# Patient Record
Sex: FEMALE | Race: BLACK OR AFRICAN AMERICAN | NOT HISPANIC OR LATINO
[De-identification: names, ages, dates, MRNs, and addresses within clinical notes are randomized per-mention and may not be internally consistent; named-entity substitution may affect disease eponyms.]

---

## 2021-10-26 ENCOUNTER — APPOINTMENT (OUTPATIENT)
Dept: HEART AND VASCULAR | Facility: CLINIC | Age: 51
End: 2021-10-26

## 2022-12-20 ENCOUNTER — NON-APPOINTMENT (OUTPATIENT)
Age: 52
End: 2022-12-20

## 2022-12-20 ENCOUNTER — APPOINTMENT (OUTPATIENT)
Dept: HEART AND VASCULAR | Facility: CLINIC | Age: 52
End: 2022-12-20

## 2022-12-20 VITALS
WEIGHT: 138 LBS | HEART RATE: 114 BPM | BODY MASS INDEX: 22.18 KG/M2 | TEMPERATURE: 97.8 F | DIASTOLIC BLOOD PRESSURE: 96 MMHG | HEIGHT: 66 IN | SYSTOLIC BLOOD PRESSURE: 140 MMHG | OXYGEN SATURATION: 99 % | RESPIRATION RATE: 16 BRPM

## 2022-12-20 PROCEDURE — 93000 ELECTROCARDIOGRAM COMPLETE: CPT

## 2022-12-20 PROCEDURE — 36415 COLL VENOUS BLD VENIPUNCTURE: CPT

## 2022-12-20 PROCEDURE — 99214 OFFICE O/P EST MOD 30 MIN: CPT | Mod: 25

## 2022-12-20 NOTE — REASON FOR VISIT
[Follow-Up - Clinic] : a clinic follow-up of [Hypertension] : hypertension [Palpitations] : palpitations [FreeTextEntry1] : 52 year old female with history of HTN, Preeclampsia last seen in 2015. She is on Coreg 20 mg PO daily and Amlodipine-Olmesartan 10-40 1 tab PO daily. She states she has been having some left sided chest discomfort, mainly around time of menstruation. Denies dyspnea, orthopnea, PND, edema. \par \par EKG today Sinus tachycardia at 102 bpm without STT abnormalities. \par \par Holter March 2015: ST at 116 bpm No APC/PVC. Monitor on for 4 days. \par \par Labs Aug 23 2022: A1C 4.75; Hgb 9.8; Hct 30.8; CMP WNL; Total chol 213; TG 55; HDL 72; ; TFTs WNL

## 2022-12-20 NOTE — DISCUSSION/SUMMARY
[Unlikely Cardiac Ischemia (Low Prob.)] : chest pain unlikely to represent cardiac ischemia (low probability) [Hypertension] : hypertension [Stable] : stable [Echocardiogram] : echocardiogram [Stress Echocardiogram] : stress echocardiogram [Lipids Test Panel] : a fasting lipid profile [Diet Modification] : diet modification [Exercise] : exercise [Sinus Tachycardia] : sinus tachycardia [de-identified] :  [de-identified] : Lipid panel today - nonfasting [de-identified] : pt to check BP 3 times daily and keep log [de-identified] : c/w Amlodipine-Olmesartan 10-40 1 tab PO daily and Coreg CR 20 mg PO daily [de-identified] : resolved [FreeTextEntry3] : after testing completed

## 2022-12-20 NOTE — PHYSICAL EXAM
[General Appearance - Well Developed] : well developed [Normal Appearance] : normal appearance [Well Groomed] : well groomed [General Appearance - Well Nourished] : well nourished [No Deformities] : no deformities [General Appearance - In No Acute Distress] : no acute distress [Normal Oral Mucosa] : normal oral mucosa [No Oral Pallor] : no oral pallor [No Oral Cyanosis] : no oral cyanosis [Normal Jugular Venous A Waves Present] : normal jugular venous A waves present [Normal Jugular Venous V Waves Present] : normal jugular venous V waves present [No Jugular Venous Alanis A Waves] : no jugular venous alanis A waves [Heart Rate And Rhythm] : heart rate and rhythm were normal [Heart Sounds] : normal S1 and S2 [Murmurs] : no murmurs present [Respiration, Rhythm And Depth] : normal respiratory rhythm and effort [Exaggerated Use Of Accessory Muscles For Inspiration] : no accessory muscle use [Auscultation Breath Sounds / Voice Sounds] : lungs were clear to auscultation bilaterally [Nail Clubbing] : no clubbing of the fingernails [Cyanosis, Localized] : no localized cyanosis [Petechial Hemorrhages (___cm)] : no petechial hemorrhages [] : no ischemic changes

## 2022-12-27 ENCOUNTER — APPOINTMENT (OUTPATIENT)
Dept: HEART AND VASCULAR | Facility: CLINIC | Age: 52
End: 2022-12-27

## 2023-01-03 ENCOUNTER — APPOINTMENT (OUTPATIENT)
Dept: HEART AND VASCULAR | Facility: CLINIC | Age: 53
End: 2023-01-03

## 2023-03-07 ENCOUNTER — APPOINTMENT (OUTPATIENT)
Dept: HEART AND VASCULAR | Facility: CLINIC | Age: 53
End: 2023-03-07

## 2024-03-12 ENCOUNTER — APPOINTMENT (OUTPATIENT)
Dept: HEART AND VASCULAR | Facility: CLINIC | Age: 54
End: 2024-03-12
Payer: COMMERCIAL

## 2024-03-12 VITALS
SYSTOLIC BLOOD PRESSURE: 118 MMHG | TEMPERATURE: 97.2 F | BODY MASS INDEX: 22.02 KG/M2 | HEART RATE: 84 BPM | WEIGHT: 137 LBS | DIASTOLIC BLOOD PRESSURE: 82 MMHG | OXYGEN SATURATION: 98 % | RESPIRATION RATE: 16 BRPM | HEIGHT: 66 IN

## 2024-03-12 DIAGNOSIS — I10 ESSENTIAL (PRIMARY) HYPERTENSION: ICD-10-CM

## 2024-03-12 PROCEDURE — 99214 OFFICE O/P EST MOD 30 MIN: CPT

## 2024-03-12 NOTE — REASON FOR VISIT
[FreeTextEntry1] : 53 year old female with history of HTN, Preeclampsia in the past. here for preoperative cardiac risk assessment prior to robotic partial nephrectomy and partial hysterectomy. She was last seen in December 2022.  Found to have renal mass in left kidney on CT. She is on Coreg 20 mg PO daily and Amlodipine-Olmesartan 10-40 1 tab PO daily. She states she gets occasional chest discomfort at time of menstruation. However can walk up a flight of stairs without chest pain and dyspnea. No PND, edema.   CT A/P 01/29/2024: Homogenous enhancing mass in the left kidney concerning for renal neoplasm, possibly papillary, compatible with reported history.  EKG 02/26/2024: NSR at 79 bpm with NSST in lead III EKG 12/2022: Sinus tachycardia at 102 bpm without STT abnormalities.  Holter March 2015: ST at 116 bpm No APC/PVC. Monitor on for 4 days.  Labs Jan 11 2024: Hgb 9.7; Hct 33.7; CMP WNL; Total chol 179; TG 49; HDL 58; ; TFTs WNL Labs Aug 23 2022: A1C 4.75; Hgb 9.8; Hct 30.8; CMP WNL; Total chol 213; TG 55; HDL 72; ; TFTs WNL

## 2024-03-12 NOTE — DISCUSSION/SUMMARY
[FreeTextEntry1] : 53 year old female with history of HTN, Preeclampsia in the past. here for preoperative cardiac risk assessment prior to robotic partial nephrectomy and partial hysterectomy. CVS stable - May proceed with procedure without further cardiac workup.  - Can continue anti-HTN medications.  - Followup in 6 monts.

## 2024-03-12 NOTE — REVIEW OF SYSTEMS
[Negative] : Heme/Lymph [FreeTextEntry2] : except as above [FreeTextEntry6] : except as above [FreeTextEntry7] : except as above

## 2024-03-20 ENCOUNTER — APPOINTMENT (OUTPATIENT)
Dept: HEART AND VASCULAR | Facility: CLINIC | Age: 54
End: 2024-03-20
Payer: COMMERCIAL

## 2024-03-20 PROCEDURE — 93306 TTE W/DOPPLER COMPLETE: CPT

## 2024-09-24 ENCOUNTER — NON-APPOINTMENT (OUTPATIENT)
Age: 54
End: 2024-09-24

## 2024-09-25 ENCOUNTER — NON-APPOINTMENT (OUTPATIENT)
Age: 54
End: 2024-09-25

## 2024-09-25 ENCOUNTER — APPOINTMENT (OUTPATIENT)
Dept: HEART AND VASCULAR | Facility: CLINIC | Age: 54
End: 2024-09-25
Payer: COMMERCIAL

## 2024-09-25 VITALS
RESPIRATION RATE: 16 BRPM | HEIGHT: 66 IN | SYSTOLIC BLOOD PRESSURE: 102 MMHG | OXYGEN SATURATION: 98 % | WEIGHT: 140 LBS | HEART RATE: 86 BPM | DIASTOLIC BLOOD PRESSURE: 70 MMHG | BODY MASS INDEX: 22.5 KG/M2

## 2024-09-25 PROCEDURE — 93000 ELECTROCARDIOGRAM COMPLETE: CPT | Mod: 59

## 2024-09-25 PROCEDURE — 93246 EXT ECG>7D<15D RECORDING: CPT

## 2024-09-25 PROCEDURE — 99213 OFFICE O/P EST LOW 20 MIN: CPT | Mod: 25

## 2024-09-25 PROCEDURE — 99203 OFFICE O/P NEW LOW 30 MIN: CPT | Mod: 25

## 2024-09-30 NOTE — REVIEW OF SYSTEMS
[Negative] : Heme/Lymph [FreeTextEntry6] : except as above [FreeTextEntry2] : except as above [FreeTextEntry7] : except as above

## 2024-10-15 ENCOUNTER — NON-APPOINTMENT (OUTPATIENT)
Age: 54
End: 2024-10-15

## 2024-10-19 PROBLEM — R00.2 PALPITATIONS: Status: ACTIVE | Noted: 2024-10-19

## 2024-10-23 PROCEDURE — 93248 EXT ECG>7D<15D REV&INTERPJ: CPT
